# Patient Record
Sex: FEMALE | Race: WHITE | Employment: UNEMPLOYED | URBAN - METROPOLITAN AREA
[De-identification: names, ages, dates, MRNs, and addresses within clinical notes are randomized per-mention and may not be internally consistent; named-entity substitution may affect disease eponyms.]

---

## 2017-09-16 PROBLEM — M72.6 NECROTIZING FASCIITIS (HCC): Status: ACTIVE | Noted: 2017-09-16

## 2018-12-24 ENCOUNTER — APPOINTMENT (OUTPATIENT)
Dept: GENERAL RADIOLOGY | Age: 33
End: 2018-12-24

## 2018-12-24 ENCOUNTER — HOSPITAL ENCOUNTER (EMERGENCY)
Age: 33
Discharge: HOME | End: 2018-12-24

## 2018-12-24 VITALS
SYSTOLIC BLOOD PRESSURE: 140 MMHG | WEIGHT: 172 LBS | DIASTOLIC BLOOD PRESSURE: 77 MMHG | TEMPERATURE: 97.5 F | HEART RATE: 99 BPM | RESPIRATION RATE: 17 BRPM | HEIGHT: 70 IN | BODY MASS INDEX: 24.62 KG/M2 | OXYGEN SATURATION: 100 %

## 2018-12-24 DIAGNOSIS — M25.441 FINGER JOINT SWELLING, RIGHT: Primary | ICD-10-CM

## 2018-12-24 PROCEDURE — 99283 EMERGENCY DEPT VISIT LOW MDM: CPT

## 2018-12-24 PROCEDURE — 73140 X-RAY EXAM OF FINGER(S): CPT

## 2018-12-24 ASSESSMENT — PAIN DESCRIPTION - PAIN TYPE: TYPE: ACUTE PAIN

## 2018-12-24 ASSESSMENT — PAIN DESCRIPTION - LOCATION: LOCATION: FINGER (COMMENT WHICH ONE)

## 2018-12-24 ASSESSMENT — PAIN SCALES - GENERAL: PAINLEVEL_OUTOF10: 5

## 2018-12-24 ASSESSMENT — PAIN DESCRIPTION - ORIENTATION: ORIENTATION: RIGHT

## 2018-12-24 ASSESSMENT — PAIN DESCRIPTION - DESCRIPTORS: DESCRIPTORS: THROBBING

## 2018-12-25 NOTE — ED NOTES
Name: Leti Stinson  : 1985  MRN: [id number]    Date: 2018    Benefits of immediately proceeding with Radiology exam outweigh the risks and therefore the following is being waived:      [x] Pregnancy test    [] Protocol for Iodine allergy    [] MRI questionnaire    [] BUN/Creatinine        MARIBELL Hunt, [place]  18 9021

## 2024-02-20 ENCOUNTER — APPOINTMENT (OUTPATIENT)
Dept: PRIMARY CARE | Facility: CLINIC | Age: 39
End: 2024-02-20
Payer: COMMERCIAL

## 2024-03-05 ENCOUNTER — OFFICE VISIT (OUTPATIENT)
Dept: PRIMARY CARE | Facility: CLINIC | Age: 39
End: 2024-03-05
Payer: COMMERCIAL

## 2024-03-05 ENCOUNTER — LAB (OUTPATIENT)
Dept: LAB | Facility: LAB | Age: 39
End: 2024-03-05
Payer: COMMERCIAL

## 2024-03-05 VITALS
BODY MASS INDEX: 30.78 KG/M2 | WEIGHT: 215 LBS | HEIGHT: 70 IN | SYSTOLIC BLOOD PRESSURE: 119 MMHG | OXYGEN SATURATION: 98 % | DIASTOLIC BLOOD PRESSURE: 72 MMHG | HEART RATE: 106 BPM

## 2024-03-05 DIAGNOSIS — Z00.00 ROUTINE GENERAL MEDICAL EXAMINATION AT A HEALTH CARE FACILITY: ICD-10-CM

## 2024-03-05 DIAGNOSIS — Z00.00 ROUTINE GENERAL MEDICAL EXAMINATION AT A HEALTH CARE FACILITY: Primary | ICD-10-CM

## 2024-03-05 DIAGNOSIS — Z11.59 NEED FOR HEPATITIS C SCREENING TEST: ICD-10-CM

## 2024-03-05 LAB
ALBUMIN SERPL BCP-MCNC: 4.5 G/DL (ref 3.4–5)
ALP SERPL-CCNC: 59 U/L (ref 33–110)
ALT SERPL W P-5'-P-CCNC: 516 U/L (ref 7–45)
ANION GAP SERPL CALC-SCNC: 12 MMOL/L (ref 10–20)
APPEARANCE UR: CLEAR
AST SERPL W P-5'-P-CCNC: 282 U/L (ref 9–39)
BILIRUB SERPL-MCNC: 1.1 MG/DL (ref 0–1.2)
BILIRUB UR STRIP.AUTO-MCNC: NEGATIVE MG/DL
BUN SERPL-MCNC: 14 MG/DL (ref 6–23)
CALCIUM SERPL-MCNC: 10.1 MG/DL (ref 8.6–10.6)
CHLORIDE SERPL-SCNC: 103 MMOL/L (ref 98–107)
CHOLEST SERPL-MCNC: 159 MG/DL (ref 0–199)
CHOLESTEROL/HDL RATIO: 3
CO2 SERPL-SCNC: 29 MMOL/L (ref 21–32)
COLOR UR: YELLOW
CREAT SERPL-MCNC: 0.9 MG/DL (ref 0.5–1.05)
EGFRCR SERPLBLD CKD-EPI 2021: 85 ML/MIN/1.73M*2
ERYTHROCYTE [DISTWIDTH] IN BLOOD BY AUTOMATED COUNT: 12.6 % (ref 11.5–14.5)
GLUCOSE SERPL-MCNC: 80 MG/DL (ref 74–99)
GLUCOSE UR STRIP.AUTO-MCNC: NORMAL MG/DL
HCT VFR BLD AUTO: 46.3 % (ref 36–46)
HCV AB SER QL: REACTIVE
HDLC SERPL-MCNC: 53.3 MG/DL
HGB BLD-MCNC: 15.6 G/DL (ref 12–16)
KETONES UR STRIP.AUTO-MCNC: NEGATIVE MG/DL
LDLC SERPL CALC-MCNC: 88 MG/DL
LEUKOCYTE ESTERASE UR QL STRIP.AUTO: NEGATIVE
MCH RBC QN AUTO: 31.1 PG (ref 26–34)
MCHC RBC AUTO-ENTMCNC: 33.7 G/DL (ref 32–36)
MCV RBC AUTO: 92 FL (ref 80–100)
NITRITE UR QL STRIP.AUTO: NEGATIVE
NON HDL CHOLESTEROL: 106 MG/DL (ref 0–149)
NRBC BLD-RTO: 0 /100 WBCS (ref 0–0)
PH UR STRIP.AUTO: 7 [PH]
PLATELET # BLD AUTO: 354 X10*3/UL (ref 150–450)
POTASSIUM SERPL-SCNC: 4.3 MMOL/L (ref 3.5–5.3)
PROT SERPL-MCNC: 7.9 G/DL (ref 6.4–8.2)
PROT UR STRIP.AUTO-MCNC: NEGATIVE MG/DL
RBC # BLD AUTO: 5.02 X10*6/UL (ref 4–5.2)
RBC # UR STRIP.AUTO: NEGATIVE /UL
SODIUM SERPL-SCNC: 140 MMOL/L (ref 136–145)
SP GR UR STRIP.AUTO: 1.02
TRIGL SERPL-MCNC: 91 MG/DL (ref 0–149)
TSH SERPL-ACNC: 2 MIU/L (ref 0.44–3.98)
UROBILINOGEN UR STRIP.AUTO-MCNC: ABNORMAL MG/DL
VLDL: 18 MG/DL (ref 0–40)
WBC # BLD AUTO: 7.9 X10*3/UL (ref 4.4–11.3)

## 2024-03-05 PROCEDURE — 85027 COMPLETE CBC AUTOMATED: CPT

## 2024-03-05 PROCEDURE — 81003 URINALYSIS AUTO W/O SCOPE: CPT

## 2024-03-05 PROCEDURE — 80053 COMPREHEN METABOLIC PANEL: CPT

## 2024-03-05 PROCEDURE — 87522 HEPATITIS C REVRS TRNSCRPJ: CPT

## 2024-03-05 PROCEDURE — 36415 COLL VENOUS BLD VENIPUNCTURE: CPT

## 2024-03-05 PROCEDURE — 99385 PREV VISIT NEW AGE 18-39: CPT | Performed by: FAMILY MEDICINE

## 2024-03-05 PROCEDURE — 99203 OFFICE O/P NEW LOW 30 MIN: CPT | Performed by: FAMILY MEDICINE

## 2024-03-05 PROCEDURE — 1036F TOBACCO NON-USER: CPT | Performed by: FAMILY MEDICINE

## 2024-03-05 PROCEDURE — 80061 LIPID PANEL: CPT

## 2024-03-05 PROCEDURE — 86803 HEPATITIS C AB TEST: CPT

## 2024-03-05 PROCEDURE — 84443 ASSAY THYROID STIM HORMONE: CPT

## 2024-03-05 ASSESSMENT — PATIENT HEALTH QUESTIONNAIRE - PHQ9
4. FEELING TIRED OR HAVING LITTLE ENERGY: NOT AT ALL
7. TROUBLE CONCENTRATING ON THINGS, SUCH AS READING THE NEWSPAPER OR WATCHING TELEVISION: SEVERAL DAYS
2. FEELING DOWN, DEPRESSED OR HOPELESS: NOT AT ALL
10. IF YOU CHECKED OFF ANY PROBLEMS, HOW DIFFICULT HAVE THESE PROBLEMS MADE IT FOR YOU TO DO YOUR WORK, TAKE CARE OF THINGS AT HOME, OR GET ALONG WITH OTHER PEOPLE: NOT DIFFICULT AT ALL
6. FEELING BAD ABOUT YOURSELF - OR THAT YOU ARE A FAILURE OR HAVE LET YOURSELF OR YOUR FAMILY DOWN: NOT AT ALL
9. THOUGHTS THAT YOU WOULD BE BETTER OFF DEAD, OR OF HURTING YOURSELF: NOT AT ALL
3. TROUBLE FALLING OR STAYING ASLEEP OR SLEEPING TOO MUCH: NOT AT ALL
1. LITTLE INTEREST OR PLEASURE IN DOING THINGS: NOT AT ALL
8. MOVING OR SPEAKING SO SLOWLY THAT OTHER PEOPLE COULD HAVE NOTICED. OR THE OPPOSITE, BEING SO FIGETY OR RESTLESS THAT YOU HAVE BEEN MOVING AROUND A LOT MORE THAN USUAL: NOT AT ALL
SUM OF ALL RESPONSES TO PHQ9 QUESTIONS 1 AND 2: 0
SUM OF ALL RESPONSES TO PHQ QUESTIONS 1-9: 1
5. POOR APPETITE OR OVEREATING: NOT AT ALL

## 2024-03-05 NOTE — PROGRESS NOTES
"Subjective   Patient ID: Lucy Montalvo [id number] is a 38 y.o. female who presents for Establish Care (New patient wants to discuss medication for Hep C).    HPI       Social History     Tobacco Use    Smoking status: Never    Smokeless tobacco: Never   Vaping Use    Vaping Use: Every day    Substances: Nicotine, Flavoring    Devices: Disposable   Substance Use Topics    Alcohol use: Yes     Comment: weekly    Drug use: Not Currently     Types: Heroin       No Known Allergies    No current outpatient medications on file.     No current facility-administered medications for this visit.       Physical Exam  /72   Pulse 106   Ht 1.778 m (5' 10\")   Wt 97.5 kg (215 lb)   SpO2 98%   BMI 30.85 kg/m²     General Appearance:  Alert, cooperative, no distress,   Head:  Normocephalic, atraumatic   Eyes:  PERRL, conjunctiva/corneas clear, EOM's intact,    Lungs:   Clear to auscultation bilaterally, respirations unlabored   Heart:  Regular rate and rhythm, S1 and S2 normal, no murmur,    Neurologic: Normal      No visits with results within 3 Month(s) from this visit.   Latest known visit with results is:   No results found for any previous visit.       Assessment/Plan   {Assess/PlanSmartLinks:10071}    "

## 2024-03-05 NOTE — PROGRESS NOTES
"Patient ID: Lucy Montalvo [id number] is a 38 y.o. female who presents for Establish Care (New patient wants to discuss medication for Hep C).    Pt is here to establish care and get complete physical  Patient was a heroin addict sober since 2018.  Patient has hep C.  Pt currently asymptomatic denies nausea vomiting abdominal pain or diarrhea.    Patient looking for treatment for hep C  Currently not following Diet or exercise  Medication - none   Supplements - none  Denies smoking/occasional alcohol,  Immunization   Flu - declined   , TDAp 2019   Pap Smear -2019 ,   LMP  - 2 days ago , regular periods    Immunization History   Administered Date(s) Administered    Pfizer Purple Cap SARS-CoV-2 05/29/2021, 06/19/2021       No Known Allergies    No current outpatient medications on file.     No current facility-administered medications for this visit.       Past Medical History:   Diagnosis Date    Infectious viral hepatitis 2018     Social History     Tobacco Use    Smoking status: Never    Smokeless tobacco: Never   Vaping Use    Vaping Use: Every day    Substances: Nicotine, Flavoring    Devices: Disposable   Substance Use Topics    Alcohol use: Yes     Comment: weekly    Drug use: Not Currently     Types: Heroin     Family History   Problem Relation Name Age of Onset    Alzheimer's disease Mother      Alzheimer's disease Maternal Grandmother      Breast cancer Maternal Grandfather      Prostate cancer Maternal Grandfather         /72   Pulse 106   Ht 1.778 m (5' 10\")   Wt 97.5 kg (215 lb)   SpO2 98%   BMI 30.85 kg/m²   Review of System  Constitutional:  Negative for appetite change, chills, fatigue, fever and unexpected weight change.   HENT:  Negative for hearing loss and voice change.    Eyes:  Negative for visual disturbance.   Respiratory:  Negative for cough, chest tightness, shortness of breath and wheezing.    Cardiovascular:  Negative for chest pain and leg swelling.   Gastrointestinal:  Negative for " "abdominal pain, blood in stool, constipation and diarrhea.   Genitourinary:  Negative for difficulty urinating and dysuria.   Musculoskeletal:  Negative for arthralgias and joint swelling.   Skin:  Negative for rash.   Neurological:  Negative for dizziness, weakness, numbness and headaches.   Psychiatric/Behavioral:  Negative for behavioral problems and confusion.    /72   Pulse 106   Ht 1.778 m (5' 10\")   Wt 97.5 kg (215 lb)   SpO2 98%   BMI 30.85 kg/m²     General Appearance:    Alert, cooperative, no distress, appears stated age   Head:    Normocephalic, without obvious abnormality, atraumatic   Eyes:    PERRL, conjunctiva/corneas clear, EOM's intact,    Neck:   Supple, symmetrical, No enlargement   Back:     Symmetric, no curvature, ROM normal, no CVA tenderness   Lungs:     Clear to auscultation bilaterally, respirations normal ,no wheezing or ronchi   Chest Wall:    No tenderness or deformity   Abdomen:     Soft, non-tender, bowel sounds active all four quadrants, no masses,   no organomegaly   Extremities:   Extremities normal, atraumatic, no cyanosis or edema   Pulses:   2+ and symmetric all extremities   Skin:   Skin color, texture, turgor normal, no rashes or lesions   Lymph nodes:   Cervical nodes normal   Neurologic:    normal strength, sensation and reflexes.      No visits with results within 3 Month(s) from this visit.   Latest known visit with results is:   No results found for any previous visit.       Assessment/Plan   Diagnoses and all orders for this visit:  Routine general medical examination at a health care facility  -     TSH with reflex to Free T4 if abnormal; Future  -     Lipid Panel; Future  -     Comprehensive Metabolic Panel; Future  -     CBC; Future  -     Urinalysis with Reflex Microscopic; Future  Need for hepatitis C screening test  -     Hepatitis C antibody; Future  -     Hepatitis C RNA, Quantitative, PCR; Future     Patient was advised to get complete blood " work  Follow healthy diet and daily walking for 30 minutes   refer to GI for treatment for hep C.      F/up 2-3 week for test results and pap smear

## 2024-03-06 LAB
HCV RNA SERPL NAA+PROBE-ACNC: ABNORMAL IU/ML
HCV RNA SERPL NAA+PROBE-ACNC: DETECTED K[IU]/ML
HCV RNA SERPL NAA+PROBE-LOG IU: 7.14 LOG IU/ML

## 2024-03-07 ENCOUNTER — TELEPHONE (OUTPATIENT)
Dept: PRIMARY CARE | Facility: CLINIC | Age: 39
End: 2024-03-07
Payer: COMMERCIAL

## 2024-03-07 DIAGNOSIS — R10.9 ABDOMINAL PAIN, UNSPECIFIED ABDOMINAL LOCATION: ICD-10-CM

## 2024-03-07 NOTE — TELEPHONE ENCOUNTER
----- Message from Lashon Odonnell MD sent at 3/6/2024  3:00 PM EST -----  Pt need Biliary ECHO for abnormal transaminase.   Need follow up With GI for treatment of Cirrhosis

## 2024-03-07 NOTE — TELEPHONE ENCOUNTER
----- Message from Lashon Odonnell MD sent at 3/7/2024 12:57 PM EST -----  Elevated liver enzymes ++   Most likely due to Hep C.  Need US for more info  ----- Message -----  From: Lucia Michaels MA  Sent: 3/7/2024   8:33 AM EST  To: Lashon Odonnell MD    Pt aware of biliary echo but wants to know more about the results such as what does this high number mean. Please advise  ----- Message -----  From: Lashon Odonnell MD  Sent: 3/6/2024   3:00 PM EST  To: Lucia Michaels MA    Pt need Biliary ECHO for abnormal transaminase.   Need follow up With GI for treatment of Cirrhosis

## 2024-03-12 ENCOUNTER — TELEPHONE (OUTPATIENT)
Dept: PRIMARY CARE | Facility: CLINIC | Age: 39
End: 2024-03-12
Payer: COMMERCIAL

## 2024-03-12 NOTE — TELEPHONE ENCOUNTER
NATASHAT WAS SEEN LAST WEEK BY DR PÉREZ AND GIVEN A CARD WITH THE NAME OF NAVJOT LIZARRAGA DR BUT LOST THE CARD.  PLEASE CALL PATIENT BACK WITH DR NAME AND NUMBER  THANK YOU

## 2024-03-22 ENCOUNTER — TELEPHONE (OUTPATIENT)
Dept: PRIMARY CARE | Facility: CLINIC | Age: 39
End: 2024-03-22
Payer: COMMERCIAL

## 2024-03-25 ENCOUNTER — TELEPHONE (OUTPATIENT)
Dept: PRIMARY CARE | Facility: CLINIC | Age: 39
End: 2024-03-25
Payer: COMMERCIAL

## 2024-03-25 NOTE — TELEPHONE ENCOUNTER
Pt aware of the ultrasound results did ask if there could be something else that could have affected the liver lab levels does have an appt on 3/28 will discuss at that appointment

## 2024-03-28 ENCOUNTER — OFFICE VISIT (OUTPATIENT)
Dept: PRIMARY CARE | Facility: CLINIC | Age: 39
End: 2024-03-28
Payer: COMMERCIAL

## 2024-03-28 VITALS
DIASTOLIC BLOOD PRESSURE: 84 MMHG | OXYGEN SATURATION: 98 % | HEART RATE: 106 BPM | BODY MASS INDEX: 30.81 KG/M2 | HEIGHT: 70 IN | WEIGHT: 215.2 LBS | SYSTOLIC BLOOD PRESSURE: 129 MMHG

## 2024-03-28 DIAGNOSIS — Z87.898 H/O INTRAVENOUS DRUG USE IN REMISSION: ICD-10-CM

## 2024-03-28 DIAGNOSIS — B18.2: ICD-10-CM

## 2024-03-28 DIAGNOSIS — Z12.4 PAP SMEAR FOR CERVICAL CANCER SCREENING: Primary | ICD-10-CM

## 2024-03-28 DIAGNOSIS — R74.01 ELEVATED TRANSAMINASE LEVEL: ICD-10-CM

## 2024-03-28 PROCEDURE — 99213 OFFICE O/P EST LOW 20 MIN: CPT | Performed by: FAMILY MEDICINE

## 2024-03-28 PROCEDURE — 1036F TOBACCO NON-USER: CPT | Performed by: FAMILY MEDICINE

## 2024-03-28 PROCEDURE — 87624 HPV HI-RISK TYP POOLED RSLT: CPT

## 2024-03-28 PROCEDURE — 88175 CYTOPATH C/V AUTO FLUID REDO: CPT

## 2024-03-28 NOTE — PROGRESS NOTES
"Patient ID: Lucy Montalvo [id number] is a 38 y.o. female who presents for Follow-up (Pap and discuss labs).    Pt  with active hepatitis C and elevated transaminase.  Liver ultrasound normal.  Patient is going to see GI in May.  Currently asymptomatic.  Patient made aware of all results  Here for Pap smear    Patient was a heroin addict sober since 2018.  Patient has hep C.  Pt currently asymptomatic denies nausea vomiting abdominal pain or diarrhea.    Patient looking for treatment for hep C  Flu - declined   , TDAp 2019   Pap Smear -2019 ,   LMP  - 2 days ago , regular periods    Immunization History   Administered Date(s) Administered    Pfizer Purple Cap SARS-CoV-2 05/29/2021, 06/19/2021       No Known Allergies    No current outpatient medications on file.     No current facility-administered medications for this visit.       Past Medical History:   Diagnosis Date    Infectious viral hepatitis 2018     Social History     Tobacco Use    Smoking status: Never    Smokeless tobacco: Never   Vaping Use    Vaping Use: Every day    Substances: Nicotine, Flavoring    Devices: Disposable   Substance Use Topics    Alcohol use: Yes     Comment: weekly    Drug use: Not Currently     Types: Heroin     Family History   Problem Relation Name Age of Onset    Alzheimer's disease Mother      Alzheimer's disease Maternal Grandmother      Breast cancer Maternal Grandfather      Prostate cancer Maternal Grandfather             /84   Pulse 106   Ht 1.778 m (5' 10\")   Wt 97.6 kg (215 lb 3.2 oz)   SpO2 98%   BMI 30.88 kg/m²     General Appearance:  Alert, cooperative, no distress,   Head:  Normocephalic, atraumatic   Eyes:  PERRL, conjunctiva/corneas clear, EOM's intact,    Lungs:   Clear to auscultation bilaterally, respirations unlabored   Heart:  Regular rate and rhythm, S1 and S2 normal, no murmur,    Extremities: Extremities normal, No edema   Neurologic: Normal     Pelvic:  External Genitalia Normal, Cervix normal, No " CMT or adnexal tenderness. No vaginal discharge           Lab on 03/05/2024   Component Date Value Ref Range Status    Thyroid Stimulating Hormone 03/05/2024 2.00  0.44 - 3.98 mIU/L Final    Cholesterol 03/05/2024 159  0 - 199 mg/dL Final          Age      Desirable   Borderline High   High     0-19 Y     0 - 169       170 - 199     >/= 200    20-24 Y     0 - 189       190 - 224     >/= 225         >24 Y     0 - 199       200 - 239     >/= 240   **All ranges are based on fasting samples. Specific   therapeutic targets will vary based on patient-specific   cardiac risk.    Pediatric guidelines reference:Pediatrics 2011, 128(S5).Adult guidelines reference: NCEP ATPIII Guidelines,EDWIN 2001, 258:2486-97    Venipuncture immediately after or during the administration of Metamizole may lead to falsely low results. Testing should be performed immediately prior to Metamizole dosing.    HDL-Cholesterol 03/05/2024 53.3  mg/dL Final      Age       Very Low   Low     Normal    High    0-19 Y    < 35      < 40     40-45     ----  20-24 Y    ----     < 40      >45      ----        >24 Y      ----     < 40     40-60      >60      Cholesterol/HDL Ratio 03/05/2024 3.0   Final      Ref Values  Desirable  < 3.4  High Risk  > 5.0    LDL Calculated 03/05/2024 88  <=99 mg/dL Final                                Near   Borderline      AGE      Desirable  Optimal    High     High     Very High     0-19 Y     0 - 109     ---    110-129   >/= 130     ----    20-24 Y     0 - 119     ---    120-159   >/= 160     ----      >24 Y     0 -  99   100-129  130-159   160-189     >/=190      VLDL 03/05/2024 18  0 - 40 mg/dL Final    Triglycerides 03/05/2024 91  0 - 149 mg/dL Final       Age         Desirable   Borderline High   High     Very High   0 D-90 D    19 - 174         ----         ----        ----  91 D- 9 Y     0 -  74        75 -  99     >/= 100      ----    10-19 Y     0 -  89        90 - 129     >/= 130      ----    20-24 Y     0 - 114        115 - 149     >/= 150      ----         >24 Y     0 - 149       150 - 199    200- 499    >/= 500    Venipuncture immediately after or during the administration of Metamizole may lead to falsely low results. Testing should be performed immediately prior to Metamizole dosing.    Non HDL Cholesterol 03/05/2024 106  0 - 149 mg/dL Final          Age       Desirable   Borderline High   High     Very High     0-19 Y     0 - 119       120 - 144     >/= 145    >/= 160    20-24 Y     0 - 149       150 - 189     >/= 190      ----         >24 Y    30 mg/dL above LDL Cholesterol goal      Glucose 03/05/2024 80  74 - 99 mg/dL Final    Sodium 03/05/2024 140  136 - 145 mmol/L Final    Potassium 03/05/2024 4.3  3.5 - 5.3 mmol/L Final    Chloride 03/05/2024 103  98 - 107 mmol/L Final    Bicarbonate 03/05/2024 29  21 - 32 mmol/L Final    Anion Gap 03/05/2024 12  10 - 20 mmol/L Final    Urea Nitrogen 03/05/2024 14  6 - 23 mg/dL Final    Creatinine 03/05/2024 0.90  0.50 - 1.05 mg/dL Final    eGFR 03/05/2024 85  >60 mL/min/1.73m*2 Final    Calculations of estimated GFR are performed using the 2021 CKD-EPI Study Refit equation without the race variable for the IDMS-Traceable creatinine methods.  https://jasn.asnjournals.org/content/early/2021/09/22/ASN.7193962597    Calcium 03/05/2024 10.1  8.6 - 10.6 mg/dL Final    Albumin 03/05/2024 4.5  3.4 - 5.0 g/dL Final    Alkaline Phosphatase 03/05/2024 59  33 - 110 U/L Final    Total Protein 03/05/2024 7.9  6.4 - 8.2 g/dL Final    AST 03/05/2024 282 (H)  9 - 39 U/L Final    Bilirubin, Total 03/05/2024 1.1  0.0 - 1.2 mg/dL Final    ALT 03/05/2024 516 (H)  7 - 45 U/L Final    Patients treated with Sulfasalazine may generate falsely decreased results for ALT.    WBC 03/05/2024 7.9  4.4 - 11.3 x10*3/uL Final    nRBC 03/05/2024 0.0  0.0 - 0.0 /100 WBCs Final    RBC 03/05/2024 5.02  4.00 - 5.20 x10*6/uL Final    Hemoglobin 03/05/2024 15.6  12.0 - 16.0 g/dL Final    Hematocrit 03/05/2024 46.3 (H)   36.0 - 46.0 % Final    MCV 03/05/2024 92  80 - 100 fL Final    MCH 03/05/2024 31.1  26.0 - 34.0 pg Final    MCHC 03/05/2024 33.7  32.0 - 36.0 g/dL Final    RDW 03/05/2024 12.6  11.5 - 14.5 % Final    Platelets 03/05/2024 354  150 - 450 x10*3/uL Final    Color, Urine 03/05/2024 Yellow  Light-Yellow, Yellow, Dark-Yellow Final    Appearance, Urine 03/05/2024 Clear  Clear Final    Specific Gravity, Urine 03/05/2024 1.017  1.005 - 1.035 Final    pH, Urine 03/05/2024 7.0  5.0, 5.5, 6.0, 6.5, 7.0, 7.5, 8.0 Final    Protein, Urine 03/05/2024 NEGATIVE  NEGATIVE, 10 (TRACE), 20 (TRACE) mg/dL Final    Glucose, Urine 03/05/2024 Normal  Normal mg/dL Final    Blood, Urine 03/05/2024 NEGATIVE  NEGATIVE Final    Ketones, Urine 03/05/2024 NEGATIVE  NEGATIVE mg/dL Final    Bilirubin, Urine 03/05/2024 NEGATIVE  NEGATIVE Final    Urobilinogen, Urine 03/05/2024 2 (1+) (A)  Normal mg/dL Final    Due to a manufacturing issue, low positive urobilinogen results may be falsely positive. Correlate with urine bilirubin and additional clinical/laboratory findings to assess the risk of hemolytic anemia or liver disease. If clinically indicated, repeat testing with an alternate method is available by contacting the laboratory within 24 hours.    Some pigments and medications may cause a false positive urobilinogen.    Nitrite, Urine 03/05/2024 NEGATIVE  NEGATIVE Final    Leukocyte Esterase, Urine 03/05/2024 NEGATIVE  NEGATIVE Final    Hepatitis C AB 03/05/2024 Reactive (A)  Nonreactive Final    HCV antibody detected. HCV RNA PCR has been ordered to evaluate the possibility of a current infection.     Results from patients taking biotin supplements or receiving high-dose biotin therapy should be interpreted with caution due to possible interference with this test. Providers may contact their local laboratory for further information.    HCV PCR Quant 03/05/2024 13,800,000 (H)  Not detected IU/mL Final    Hepatitis C RNA PCR Log 03/05/2024 7.14  Log  IU/mL Final    Not calculated    HCV RNA Result 03/05/2024 Detected (A)  Not detected Final       Assessment/Plan   Diagnoses and all orders for this visit:  Lucy was seen today for follow-up.  Diagnoses and all orders for this visit:  Pap smear for cervical cancer screening (Primary)  -     THINPREP PAP TEST  Chronic active hepatitis C (CMS/HCC)  H/O intravenous drug use in remission  Elevated transaminase level    Patient was informed of all test result and ultrasound.  F/up  GI for treatment for active hep C.    Pap smear done today will call with abnormal results

## 2024-04-08 LAB
CYTOLOGY CMNT CVX/VAG CYTO-IMP: NORMAL
HPV HR 12 DNA GENITAL QL NAA+PROBE: NEGATIVE
HPV HR GENOTYPES PNL CVX NAA+PROBE: NEGATIVE
HPV16 DNA SPEC QL NAA+PROBE: NEGATIVE
HPV18 DNA SPEC QL NAA+PROBE: NEGATIVE
LAB AP HPV GENOTYPE QUESTION: YES
LAB AP HPV HR: NORMAL
LABORATORY COMMENT REPORT: NORMAL
PATH REPORT.TOTAL CANCER: NORMAL

## 2024-06-21 ENCOUNTER — APPOINTMENT (OUTPATIENT)
Dept: PRIMARY CARE | Facility: CLINIC | Age: 39
End: 2024-06-21
Payer: COMMERCIAL

## 2024-07-17 ENCOUNTER — HOSPITAL ENCOUNTER (EMERGENCY)
Facility: HOSPITAL | Age: 39
Discharge: HOME | End: 2024-07-17
Payer: COMMERCIAL

## 2024-07-17 VITALS
HEART RATE: 111 BPM | HEIGHT: 70 IN | WEIGHT: 200 LBS | BODY MASS INDEX: 28.63 KG/M2 | SYSTOLIC BLOOD PRESSURE: 154 MMHG | OXYGEN SATURATION: 96 % | TEMPERATURE: 98.7 F | DIASTOLIC BLOOD PRESSURE: 74 MMHG | RESPIRATION RATE: 16 BRPM

## 2024-07-17 DIAGNOSIS — S61.219A FINGER LACERATION, INITIAL ENCOUNTER: Primary | ICD-10-CM

## 2024-07-17 PROCEDURE — 99283 EMERGENCY DEPT VISIT LOW MDM: CPT

## 2024-07-17 PROCEDURE — 2500000001 HC RX 250 WO HCPCS SELF ADMINISTERED DRUGS (ALT 637 FOR MEDICARE OP): Performed by: NURSE PRACTITIONER

## 2024-07-17 PROCEDURE — 2500000005 HC RX 250 GENERAL PHARMACY W/O HCPCS: Performed by: NURSE PRACTITIONER

## 2024-07-17 RX ORDER — TRAMADOL HYDROCHLORIDE 50 MG/1
50 TABLET ORAL ONCE
Status: COMPLETED | OUTPATIENT
Start: 2024-07-17 | End: 2024-07-17

## 2024-07-17 RX ORDER — BACITRACIN ZINC 500 UNIT/G
OINTMENT IN PACKET (EA) TOPICAL ONCE
Status: COMPLETED | OUTPATIENT
Start: 2024-07-17 | End: 2024-07-17

## 2024-07-17 ASSESSMENT — LIFESTYLE VARIABLES
EVER FELT BAD OR GUILTY ABOUT YOUR DRINKING: NO
HAVE YOU EVER FELT YOU SHOULD CUT DOWN ON YOUR DRINKING: NO
EVER HAD A DRINK FIRST THING IN THE MORNING TO STEADY YOUR NERVES TO GET RID OF A HANGOVER: NO
HAVE PEOPLE ANNOYED YOU BY CRITICIZING YOUR DRINKING: NO
TOTAL SCORE: 0

## 2024-07-17 ASSESSMENT — PAIN SCALES - GENERAL: PAINLEVEL_OUTOF10: 10 - WORST POSSIBLE PAIN

## 2024-07-17 ASSESSMENT — COLUMBIA-SUICIDE SEVERITY RATING SCALE - C-SSRS
6. HAVE YOU EVER DONE ANYTHING, STARTED TO DO ANYTHING, OR PREPARED TO DO ANYTHING TO END YOUR LIFE?: NO
1. IN THE PAST MONTH, HAVE YOU WISHED YOU WERE DEAD OR WISHED YOU COULD GO TO SLEEP AND NOT WAKE UP?: NO
2. HAVE YOU ACTUALLY HAD ANY THOUGHTS OF KILLING YOURSELF?: NO

## 2024-07-17 ASSESSMENT — PAIN - FUNCTIONAL ASSESSMENT: PAIN_FUNCTIONAL_ASSESSMENT: 0-10

## 2024-07-17 NOTE — ED PROVIDER NOTES
HPI   Chief Complaint   Patient presents with    Laceration     Cut tip of thumb with vegetable slicer       This is a 39-year-old  female presenting to the emergency room with complaints of a right thumb laceration while using a mandolin slicer.  The patient was trying to slice vegetables and accidentally cut her thumb on the blade.  The patient reports that she is experiencing 10 out of 10 pain to the right thumb.  She denies any loss of motor function or sensation to the extremity.  The patient is right-hand dominant.  Her immunizations are up-to-date.      History provided by:  Patient   used: No            Patient History   Past Medical History:   Diagnosis Date    Infectious viral hepatitis 2018     History reviewed. No pertinent surgical history.  Family History   Problem Relation Name Age of Onset    Alzheimer's disease Mother      Alzheimer's disease Maternal Grandmother      Breast cancer Maternal Grandfather      Prostate cancer Maternal Grandfather       Social History     Tobacco Use    Smoking status: Never    Smokeless tobacco: Never   Vaping Use    Vaping status: Every Day    Substances: Nicotine, Flavoring    Devices: Disposable   Substance Use Topics    Alcohol use: Yes     Comment: weekly    Drug use: Not Currently     Types: Heroin       Physical Exam   ED Triage Vitals [07/17/24 1833]   Temperature Heart Rate Respirations BP   37.1 °C (98.7 °F) (!) 111 16 154/74      Pulse Ox Temp src Heart Rate Source Patient Position   96 % -- -- --      BP Location FiO2 (%)     -- --       Physical Exam  Vitals and nursing note reviewed.   HENT:      Head: Normocephalic.   Eyes:      Conjunctiva/sclera: Conjunctivae normal.      Pupils: Pupils are equal, round, and reactive to light.   Cardiovascular:      Rate and Rhythm: Normal rate and regular rhythm.      Pulses: Normal pulses.      Heart sounds: Normal heart sounds.   Pulmonary:      Effort: Pulmonary effort is normal.       Breath sounds: Normal breath sounds.   Musculoskeletal:         General: Normal range of motion.      Comments: Patient has full no laxity of the extremity with full range of motion against resistance.  Distal extremity with brisk cap refill and sensation intact.   Skin:     General: Skin is warm.      Capillary Refill: Capillary refill takes less than 2 seconds.      Comments: The patient has a 1.5 cm avulsion laceration noted to the lateral aspect of the right first distal phalanx.  No nailbed involvement.  There is persistent bleeding.  The wound is very superficial in nature.  No bony involvement.   Neurological:      General: No focal deficit present.      Mental Status: She is alert.   Psychiatric:         Mood and Affect: Mood normal.           ED Course & MDM   Diagnoses as of 07/17/24 1937   Finger laceration, initial encounter                       José Miguel Coma Scale Score: 15                        Medical Decision Making  Patient was seen and evaluated by the nurse practitioner, Maureen Luna.  The right thumb was evaluated and she has an avulsion laceration noted to the distal phalanx.  Suture repair is not possible.  The wound was cleaned with Shur-Clens and irrigated with sterile saline.  Gelfoam, bacitracin, and a nonadherent dressing was applied.  The patient tolerated the procedure well.  The patient was educated on wound care and signs and symptoms of infection.  The patient is to follow up with their primary care physician in the next 2-3 days.  The patient is to return to the ED worse in any way.  The patient was discharged in stable condition with computer discharge instructions given. Patient was agreeable with discharge planning.        Procedure  Procedures     FLOR Edwards-CNP  07/17/24 1940

## 2024-12-10 LAB
NON-UH HIE A/G RATIO: 1.1
NON-UH HIE ALB: 4 G/DL (ref 3.4–5)
NON-UH HIE ALK PHOS: 74 UNIT/L (ref 45–117)
NON-UH HIE BASO COUNT: 0.07 X1000 (ref 0–0.2)
NON-UH HIE BASOS %: 1.3 %
NON-UH HIE BILIRUBIN, TOTAL: 1.1 MG/DL (ref 0.3–1.2)
NON-UH HIE BUN/CREAT RATIO: 12.5
NON-UH HIE BUN: 10 MG/DL (ref 9–23)
NON-UH HIE CALCIUM: 9.6 MG/DL (ref 8.7–10.4)
NON-UH HIE CALCULATED OSMOLALITY: 281 MOSM/KG (ref 275–295)
NON-UH HIE CHLORIDE: 108 MMOL/L (ref 98–107)
NON-UH HIE CO2, VENOUS: 26 MMOL/L (ref 20–31)
NON-UH HIE CREATININE: 0.8 MG/DL (ref 0.5–0.8)
NON-UH HIE DIFF?: NO
NON-UH HIE EOS COUNT: 0.14 X1000 (ref 0–0.5)
NON-UH HIE EOSIN %: 2.4 %
NON-UH HIE GFR AA: >60
NON-UH HIE GLOBULIN: 3.6 G/DL
NON-UH HIE GLOMERULAR FILTRATION RATE: >60 ML/MIN/1.73M?
NON-UH HIE GLUCOSE: 84 MG/DL (ref 74–106)
NON-UH HIE GOT: 24 UNIT/L (ref 15–37)
NON-UH HIE GPT: 16 UNIT/L (ref 10–49)
NON-UH HIE HCT: 43 % (ref 36–46)
NON-UH HIE HGB: 15.1 G/DL (ref 12–16)
NON-UH HIE INSTR WBC: 5.7
NON-UH HIE K: 4.2 MMOL/L (ref 3.5–5.1)
NON-UH HIE LYMPH %: 32.3 %
NON-UH HIE LYMPH COUNT: 1.84 X1000 (ref 1.2–4.8)
NON-UH HIE MCH: 31.4 PG (ref 27–34)
NON-UH HIE MCHC: 35 G/DL (ref 32–37)
NON-UH HIE MCV: 89.6 FL (ref 80–100)
NON-UH HIE MONO %: 6.1 %
NON-UH HIE MONO COUNT: 0.35 X1000 (ref 0.1–1)
NON-UH HIE MPV: 8.4 FL (ref 7.4–10.4)
NON-UH HIE NA: 142 MMOL/L (ref 135–145)
NON-UH HIE NEUTROPHIL %: 57.8 %
NON-UH HIE NEUTROPHIL COUNT (ANC): 3.3 X1000 (ref 1.4–8.8)
NON-UH HIE NUCLEATED RBC: 0 /100WBC
NON-UH HIE PLATELET: 323 X10 (ref 150–450)
NON-UH HIE RBC: 4.8 X10 (ref 4.2–5.4)
NON-UH HIE RDW: 12.4 % (ref 11.5–14.5)
NON-UH HIE TOTAL PROTEIN: 7.6 G/DL (ref 5.7–8.2)
NON-UH HIE WBC: 5.7 X10 (ref 4.5–11)

## 2024-12-12 LAB
NON-UH HIE HCV QNT BY NAAT (IU/ML): 16 IU/ML
NON-UH HIE HCV QNT BY NAAT (LOG IU/ML): 1.19
NON-UH HIE HCV QNT BY NAAT INTERP: DETECTED

## 2025-01-13 ENCOUNTER — APPOINTMENT (OUTPATIENT)
Dept: GENERAL RADIOLOGY | Age: 40
End: 2025-01-13
Payer: COMMERCIAL

## 2025-01-13 ENCOUNTER — HOSPITAL ENCOUNTER (EMERGENCY)
Age: 40
Discharge: HOME | End: 2025-01-13
Attending: EMERGENCY MEDICINE
Payer: COMMERCIAL

## 2025-01-13 VITALS
HEART RATE: 102 BPM | BODY MASS INDEX: 31.22 KG/M2 | RESPIRATION RATE: 18 BRPM | OXYGEN SATURATION: 99 % | TEMPERATURE: 98.6 F | WEIGHT: 217.6 LBS | SYSTOLIC BLOOD PRESSURE: 126 MMHG | DIASTOLIC BLOOD PRESSURE: 86 MMHG

## 2025-01-13 DIAGNOSIS — J06.9 ACUTE UPPER RESPIRATORY INFECTION: Primary | ICD-10-CM

## 2025-01-13 LAB
FLUAV RNA RESP QL NAA+PROBE: NOT DETECTED
FLUBV RNA RESP QL NAA+PROBE: NOT DETECTED
RSV BY PCR: NOT DETECTED
SARS-COV-2 RNA RESP QL NAA+PROBE: NOT DETECTED
SOURCE: NORMAL
SPECIMEN DESCRIPTION: NORMAL
SPECIMEN SOURCE: NORMAL
SPECIMEN SOURCE: NORMAL
STREP A, MOLECULAR: NEGATIVE

## 2025-01-13 PROCEDURE — 99284 EMERGENCY DEPT VISIT MOD MDM: CPT

## 2025-01-13 PROCEDURE — 6370000000 HC RX 637 (ALT 250 FOR IP): Performed by: EMERGENCY MEDICINE

## 2025-01-13 PROCEDURE — 87651 STREP A DNA AMP PROBE: CPT

## 2025-01-13 PROCEDURE — 71045 X-RAY EXAM CHEST 1 VIEW: CPT

## 2025-01-13 PROCEDURE — 87636 SARSCOV2 & INF A&B AMP PRB: CPT

## 2025-01-13 PROCEDURE — 87634 RSV DNA/RNA AMP PROBE: CPT

## 2025-01-13 RX ORDER — GUAIFENESIN AND DEXTROMETHORPHAN HYDROBROMIDE 1200; 60 MG/1; MG/1
1 TABLET, EXTENDED RELEASE ORAL EVERY 12 HOURS PRN
Qty: 28 TABLET | Refills: 0 | Status: SHIPPED | OUTPATIENT
Start: 2025-01-13

## 2025-01-13 RX ORDER — AZITHROMYCIN 500 MG/1
500 TABLET, FILM COATED ORAL DAILY
Qty: 5 TABLET | Refills: 0 | Status: SHIPPED | OUTPATIENT
Start: 2025-01-13 | End: 2025-01-18

## 2025-01-13 RX ORDER — ALBUTEROL SULFATE 90 UG/1
2 INHALANT RESPIRATORY (INHALATION) 4 TIMES DAILY PRN
Qty: 18 G | Refills: 0 | Status: SHIPPED | OUTPATIENT
Start: 2025-01-13

## 2025-01-13 RX ORDER — PREDNISONE 50 MG/1
50 TABLET ORAL DAILY
Qty: 5 TABLET | Refills: 0 | Status: SHIPPED | OUTPATIENT
Start: 2025-01-13 | End: 2025-01-18

## 2025-01-13 RX ORDER — PREDNISONE 20 MG/1
60 TABLET ORAL ONCE
Status: COMPLETED | OUTPATIENT
Start: 2025-01-13 | End: 2025-01-13

## 2025-01-13 RX ORDER — BENZONATATE 100 MG/1
100 CAPSULE ORAL 3 TIMES DAILY PRN
Qty: 30 CAPSULE | Refills: 0 | Status: SHIPPED | OUTPATIENT
Start: 2025-01-13 | End: 2025-01-23

## 2025-01-13 RX ORDER — AZITHROMYCIN 250 MG/1
500 TABLET, FILM COATED ORAL ONCE
Status: COMPLETED | OUTPATIENT
Start: 2025-01-13 | End: 2025-01-13

## 2025-01-13 RX ADMIN — AZITHROMYCIN DIHYDRATE 500 MG: 250 TABLET ORAL at 17:53

## 2025-01-13 RX ADMIN — PREDNISONE 60 MG: 20 TABLET ORAL at 17:53

## 2025-01-13 ASSESSMENT — PAIN - FUNCTIONAL ASSESSMENT: PAIN_FUNCTIONAL_ASSESSMENT: NONE - DENIES PAIN

## 2025-01-13 ASSESSMENT — LIFESTYLE VARIABLES
HOW OFTEN DO YOU HAVE A DRINK CONTAINING ALCOHOL: NEVER
HOW MANY STANDARD DRINKS CONTAINING ALCOHOL DO YOU HAVE ON A TYPICAL DAY: PATIENT DOES NOT DRINK

## 2025-01-13 ASSESSMENT — PAIN SCALES - GENERAL: PAINLEVEL_OUTOF10: 0

## 2025-01-13 NOTE — DISCHARGE INSTRUCTIONS
Call family doctor tomorrow and schedule a followup appointment to be seen in 2 days    Have your doctor obtain  finalized report of all results    XR CHEST PORTABLE   Final Result   No acute cardiopulmonary disease.           Results for orders placed or performed during the hospital encounter of 01/13/25   COVID-19 & Influenza Combo    Specimen: Nasopharyngeal Swab   Result Value Ref Range    Specimen Description .NASOPHARYNGEAL SWAB     Source .NASOPHARYNGEAL SWAB     SARS-CoV-2 RNA, RT PCR Not Detected Not Detected    Influenza A Not Detected Not Detected    Influenza B Not Detected Not Detected   RSV Detection    Specimen: Nasopharyngeal Swab   Result Value Ref Range    Source .NASOPHARYNGEAL SWAB     RSV by PCR Not Detected Not Detected   Rapid Strep Screen    Specimen: Throat   Result Value Ref Range    Source .THROAT SWAB     Strep A, Molecular NEGATIVE NEGATIVE

## 2025-01-14 ENCOUNTER — APPOINTMENT (OUTPATIENT)
Dept: PRIMARY CARE | Facility: CLINIC | Age: 40
End: 2025-01-14
Payer: COMMERCIAL

## 2025-01-14 ENCOUNTER — LAB (OUTPATIENT)
Dept: LAB | Facility: LAB | Age: 40
End: 2025-01-14
Payer: COMMERCIAL

## 2025-01-14 VITALS
HEART RATE: 121 BPM | OXYGEN SATURATION: 97 % | BODY MASS INDEX: 30.25 KG/M2 | WEIGHT: 210.8 LBS | DIASTOLIC BLOOD PRESSURE: 87 MMHG | SYSTOLIC BLOOD PRESSURE: 136 MMHG

## 2025-01-14 DIAGNOSIS — E66.811 CLASS 1 DRUG-INDUCED OBESITY WITHOUT SERIOUS COMORBIDITY WITH BODY MASS INDEX (BMI) OF 30.0 TO 30.9 IN ADULT: ICD-10-CM

## 2025-01-14 DIAGNOSIS — B18.2: ICD-10-CM

## 2025-01-14 DIAGNOSIS — Z00.00 ROUTINE GENERAL MEDICAL EXAMINATION AT A HEALTH CARE FACILITY: Primary | ICD-10-CM

## 2025-01-14 DIAGNOSIS — E66.1 CLASS 1 DRUG-INDUCED OBESITY WITHOUT SERIOUS COMORBIDITY WITH BODY MASS INDEX (BMI) OF 30.0 TO 30.9 IN ADULT: ICD-10-CM

## 2025-01-14 DIAGNOSIS — Z00.00 ROUTINE GENERAL MEDICAL EXAMINATION AT A HEALTH CARE FACILITY: ICD-10-CM

## 2025-01-14 LAB
NON-UH HIE A/G RATIO: 1.1
NON-UH HIE ALB: 4.4 G/DL (ref 3.4–5)
NON-UH HIE ALK PHOS: 63 UNIT/L (ref 45–117)
NON-UH HIE BASO COUNT: 0.02 X1000 (ref 0–0.2)
NON-UH HIE BASOS %: 0.2 %
NON-UH HIE BILIRUBIN, TOTAL: 0.5 MG/DL (ref 0.3–1.2)
NON-UH HIE BUN/CREAT RATIO: 7.8
NON-UH HIE BUN: 7 MG/DL (ref 9–23)
NON-UH HIE CALCIUM: 10.7 MG/DL (ref 8.7–10.4)
NON-UH HIE CALCULATED OSMOLALITY: 277 MOSM/KG (ref 275–295)
NON-UH HIE CHLORIDE: 104 MMOL/L (ref 98–107)
NON-UH HIE CO2, VENOUS: 28 MMOL/L (ref 20–31)
NON-UH HIE CREATININE: 0.9 MG/DL (ref 0.5–0.8)
NON-UH HIE DIFF?: NO
NON-UH HIE DXH ACTIONS: ABNORMAL
NON-UH HIE EOS COUNT: 0 X1000 (ref 0–0.5)
NON-UH HIE EOSIN %: 0 %
NON-UH HIE GFR AA: >60
NON-UH HIE GLOBULIN: 4 G/DL
NON-UH HIE GLOMERULAR FILTRATION RATE: >60 ML/MIN/1.73M?
NON-UH HIE GLUCOSE: 122 MG/DL (ref 74–106)
NON-UH HIE GOT: 25 UNIT/L (ref 15–37)
NON-UH HIE GPT: 24 UNIT/L (ref 10–49)
NON-UH HIE HCT: 43.6 % (ref 36–46)
NON-UH HIE HGB: 15.2 G/DL (ref 12–16)
NON-UH HIE INSTR WBC: 9.3
NON-UH HIE K: 3.7 MMOL/L (ref 3.5–5.1)
NON-UH HIE LYMPH %: 5.5 %
NON-UH HIE LYMPH COUNT: 0.51 X1000 (ref 1.2–4.8)
NON-UH HIE MCH: 31.3 PG (ref 27–34)
NON-UH HIE MCHC: 34.9 G/DL (ref 32–37)
NON-UH HIE MCV: 89.6 FL (ref 80–100)
NON-UH HIE MONO %: 1.7 %
NON-UH HIE MONO COUNT: 0.16 X1000 (ref 0.1–1)
NON-UH HIE MPV: 8.6 FL (ref 7.4–10.4)
NON-UH HIE NA: 139 MMOL/L (ref 135–145)
NON-UH HIE NEUTROPHIL %: 92.5 %
NON-UH HIE NEUTROPHIL COUNT (ANC): 8.6 X1000 (ref 1.4–8.8)
NON-UH HIE NUCLEATED RBC: 0 /100WBC
NON-UH HIE PLATELET: 324 X10 (ref 150–450)
NON-UH HIE RBC: 4.86 X10 (ref 4.2–5.4)
NON-UH HIE RDW: 12.4 % (ref 11.5–14.5)
NON-UH HIE SCAN DIFFERENTIAL: ABNORMAL
NON-UH HIE TOTAL PROTEIN: 8.4 G/DL (ref 5.7–8.2)
NON-UH HIE WBC: 9.3 X10 (ref 4.5–11)

## 2025-01-14 PROCEDURE — 84481 FREE ASSAY (FT-3): CPT

## 2025-01-14 PROCEDURE — 80061 LIPID PANEL: CPT

## 2025-01-14 PROCEDURE — 84443 ASSAY THYROID STIM HORMONE: CPT

## 2025-01-14 PROCEDURE — 99395 PREV VISIT EST AGE 18-39: CPT | Performed by: FAMILY MEDICINE

## 2025-01-14 PROCEDURE — 99213 OFFICE O/P EST LOW 20 MIN: CPT | Performed by: FAMILY MEDICINE

## 2025-01-14 PROCEDURE — 83036 HEMOGLOBIN GLYCOSYLATED A1C: CPT

## 2025-01-14 PROCEDURE — 1036F TOBACCO NON-USER: CPT | Performed by: FAMILY MEDICINE

## 2025-01-14 PROCEDURE — G0447 BEHAVIOR COUNSEL OBESITY 15M: HCPCS | Performed by: FAMILY MEDICINE

## 2025-01-14 RX ORDER — BENZONATATE 100 MG/1
1 CAPSULE ORAL 3 TIMES DAILY PRN
COMMUNITY
Start: 2025-01-13 | End: 2025-01-23

## 2025-01-14 RX ORDER — BUPROPION HYDROCHLORIDE 150 MG/1
150 TABLET, EXTENDED RELEASE ORAL 2 TIMES DAILY
Qty: 60 TABLET | Refills: 1 | Status: SHIPPED | OUTPATIENT
Start: 2025-01-14 | End: 2025-03-15

## 2025-01-14 RX ORDER — GUAIFENESIN AND DEXTROMETHORPHAN HYDROBROMIDE 1200; 60 MG/1; MG/1
1 TABLET, EXTENDED RELEASE ORAL 2 TIMES DAILY
COMMUNITY
Start: 2025-01-13

## 2025-01-14 RX ORDER — AZITHROMYCIN 500 MG/1
1 TABLET, FILM COATED ORAL DAILY
COMMUNITY
Start: 2025-01-13 | End: 2025-01-18

## 2025-01-14 RX ORDER — ALBUTEROL SULFATE 90 UG/1
2 INHALANT RESPIRATORY (INHALATION) 4 TIMES DAILY PRN
COMMUNITY
Start: 2025-01-13

## 2025-01-14 ASSESSMENT — PATIENT HEALTH QUESTIONNAIRE - PHQ9
SUM OF ALL RESPONSES TO PHQ9 QUESTIONS 1 AND 2: 0
1. LITTLE INTEREST OR PLEASURE IN DOING THINGS: NOT AT ALL
2. FEELING DOWN, DEPRESSED OR HOPELESS: NOT AT ALL

## 2025-01-14 NOTE — PROGRESS NOTES
Patient ID: Lucy Montalvo [id number] is a 39 y.o. female who presents for Follow-up (Follow up to discuss wt).    Pt is here to get complete physical and discuss weight loss.    Patient was a heroin addict sober since 2018.     Pt currently asymptomatic denies nausea vomiting abdominal pain or diarrhea.    Patient completed treatment for  hep C  late 2024, pt following GI Dr. Vollweiler.  Pt is not able to control the portions , no specific craving  Pt is planning to start exercising [place].  Medication - none   Supplements - none  Denies smoking/occasional alcohol,  Immunization   Flu - declined   , TDAp 2019   Pap Smear -2024 was normal     Patient tried Wellbutrin in past with more constipation.  Weight loss options and medication discussed with the patient.    Patient was advised to count calories and start aerobic exercising 30 to 40-minute daily 5 days a week.  Immunization History   Administered Date(s) Administered    Hepatitis B vaccine, adult *Check Product/Dose* 11/05/2018    Pfizer Purple Cap SARS-CoV-2 05/29/2021, 06/19/2021    Tdap vaccine, age 7 year and older (BOOSTRIX, ADACEL) 10/16/2018, 03/25/2019       No Known Allergies    Current Outpatient Medications   Medication Sig Dispense Refill    albuterol 90 mcg/actuation inhaler Inhale 2 puffs 4 times a day as needed.      azithromycin (Zithromax) 500 mg tablet Take 1 tablet (500 mg) by mouth once daily.      benzonatate (Tessalon) 100 mg capsule Take 1 capsule (100 mg) by mouth 3 times a day as needed.      dextromethorphan-guaifenesin (MUCINEX DM) 60-1,200 mg tablet extended release 12 hr Take 1 tablet by mouth 2 times a day.       No current facility-administered medications for this visit.       Past Medical History:   Diagnosis Date    Infectious viral hepatitis 2018     Social History     Tobacco Use    Smoking status: Never    Smokeless tobacco: Never   Vaping Use    Vaping status: Every Day    Substances: Nicotine, Flavoring    Devices:  Disposable   Substance Use Topics    Alcohol use: Yes     Comment: weekly    Drug use: Not Currently     Types: Heroin     Family History   Problem Relation Name Age of Onset    Alzheimer's disease Mother      Alzheimer's disease Maternal Grandmother      Breast cancer Maternal Grandfather      Prostate cancer Maternal Grandfather         /87   Pulse (!) 121   Wt 95.6 kg (210 lb 12.8 oz)   SpO2 97%   BMI 30.25 kg/m²   Review of System  Constitutional:  Negative for appetite change, chills, fatigue, fever and unexpected weight change.   HENT:  Negative for hearing loss and voice change.    Eyes:  Negative for visual disturbance.   Respiratory:  Negative for cough, chest tightness, shortness of breath and wheezing.    Cardiovascular:  Negative for chest pain and leg swelling.   Gastrointestinal:  Negative for abdominal pain, blood in stool, constipation and diarrhea.   Genitourinary:  Negative for difficulty urinating and dysuria.   Musculoskeletal:  Negative for arthralgias and joint swelling.   Skin:  Negative for rash.   Neurological:  Negative for dizziness, weakness, numbness and headaches.   Psychiatric/Behavioral:  Negative for behavioral problems and confusion.    /87   Pulse (!) 121   Wt 95.6 kg (210 lb 12.8 oz)   SpO2 97%   BMI 30.25 kg/m²     General Appearance:    Alert, cooperative, no distress, appears stated age   Head:    Normocephalic, without obvious abnormality, atraumatic   Eyes:    PERRL, conjunctiva/corneas clear, EOM's intact,    Neck:   Supple, symmetrical, No enlargement   Back:     Symmetric, no curvature, ROM normal, no CVA tenderness   Lungs:     Clear to auscultation bilaterally, respirations normal ,no wheezing or ronchi   Chest Wall:    No tenderness or deformity   Abdomen:     Soft, non-tender, bowel sounds active all four quadrants, no masses,   no organomegaly   Extremities:   Extremities normal, atraumatic, no cyanosis or edema   Pulses:   2+ and symmetric all  extremities   Skin:   Skin color, texture, turgor normal, no rashes or lesions   Lymph nodes:   Cervical nodes normal   Neurologic:    normal strength, sensation and reflexes.      Orders Only on 12/12/2024   Component Date Value Ref Range Status    [facility] HCV Qnt by NAAT Interp 12/12/2024 Detected  Not Detected Final    Comment: INTERPRETIVE INFORMATION: HCV by Quantitative NAAT     The quantitative range of this test is ,000,000 IU/mL   (1.18-8.0 log IU/mL).     A result of Not Detected does not rule out the presence   of inhibitors in the patient specimen or hepatitis C virus   RNA concentrations below the level of detection of the   test. Care should be taken when interpreting any single   viral load determination.     This test is intended for use as an aid in the diagnosis of   HCV infection in the following populations: individuals   with antibody evidence of HCV with evidence of liver   disease, individuals suspected to be actively infected with   HCV antibody evidence, and individuals at risk for HCV   infection with antibodies to HCV. Detection of HCV RNA   indicates that the virus is replicating and therefore is   evidence of active infection.      This test is also intended for use as an aid in the   management of patients with an HCV infection undergoing   antiviral therapy. The assay can be                            used to measure HCV RNA   levels at baseline, during treatment, at the end of   treatment, and at the end of follow-up of treatment to   determine sustained or nonsustained viral response. The   results must be interpreted within the context of all   relevant clinical and laboratory findings.      This test should not be used for blood donor screening,   associated reentry protocols, or for screening human cells,   tissues, and cellular tissue-based products (HCT/P).  Performed By: [facility]  [address] [place] [id number]  : Carlos CAVANAUGH  MD Lis, PhD  CLIA Number: [id number]      [facility] HCV Qnt by NAAT (IU/mL) 12/12/2024 16  IU/mL Final    [facility] HCV Qnt by NAAT (log IU* 12/12/2024 1.19   Final   Orders Only on 12/10/2024   Component Date Value Ref Range Status    [facility] MCH 12/10/2024 31.4  27.0 - 34.0 pg Final    [facility] Instr WBC 12/10/2024 5.7   Final    [facility] WBC 12/10/2024 5.7  4.5 - 11.0 x10 Final    [facility] HCT 12/10/2024 43.0  36.0 - 46.0 % Final    [facility] Platelet 12/10/2024 323  150 - 450 x10 Final    [facility] MCV 12/10/2024 89.6  80.0 - 100.0 fL Final    [facility] RDW 12/10/2024 12.4  11.5 - 14.5 % Final    [facility] RBC 12/10/2024 4.80  4.20 - 5.40 x10 Final    Note: RBC morphology is normal unless otherwise stated.  Evaluation performed only if differential is requested.    [facility] Nucleated RBC 12/10/2024 0  /100WBC Final    [facility] MCHC 12/10/2024 35.0  32.0 - 37.0 g/dL Final    [facility] DIFF? 12/10/2024 No   Final    [facility] HGB 12/10/2024 15.1  12.0 - 16.0 g/dL Final    [facility] MPV 12/10/2024 8.4  7.4 - 10.4 fL Final    [facility] Neutrophil Count (ANC) 12/10/2024 3.30  1.40 - 8.80 x1000 Final    [facility] Eos Count 12/10/2024 0.14  0.00 - 0.50 x1000 Final    [facility] Lymph Count 12/10/2024 1.84  1.20 - 4.80 x1000 Final    [facility] Eosin % 12/10/2024 2.4  % Final    [facility] Lymph % 12/10/2024 32.3  % Final    [facility] Baso Count 12/10/2024 0.07  0.00 - 0.20 x1000 Final    [facility] Basos % 12/10/2024 1.3  % Final    [facility] Mono Count 12/10/2024 0.35  0.10 - 1.00 x1000 Final    [facility] Mono % 12/10/2024 6.1  % Final    [facility] Neutrophil % 12/10/2024 57.8  % Final    [facility] Alk Phos 12/10/2024 74  45 - 117 unit/L Final    [facility] Creatinine 12/10/2024 0.8  0.5 - 0.8 mg/dL Final    [facility] GPT 12/10/2024 16  10 - 49 unit/L Final    [facility] CO2, venous 12/10/2024 26.0  20.0 - 31.0 mmol/L Final    [facility] Total Protein 12/10/2024 7.6  5.7 - 8.2 g/dL  Final    Total Protein results may be increased in patients receiving dextran as a blood volume expander    [facility] Glomerular Filtration R* 12/10/2024 >60  mL/min/1.73m? Final    Comment: Non- GFR CalcMedical judgement is necessary to interpret GFR.  The calculated GFR may not accurately reflect renal status in patients >70 years, pregnant women, acutely ill hospitalized patients and patients with acute renal failure or known renal disease.  The MDRD GFR formula is valid only for adults greater than 18 years of age.  Note:  Creatinine clearance (not GFR) should be used for drug dosing.      [facility] Calculated Osmolality 12/10/2024 281  275 - 295 mOsm/kg Final    [facility] K 12/10/2024 4.2  3.5 - 5.1 mmol/L Final    [facility] Globulin 12/10/2024 3.6  g/dL Final    [facility] BUN 12/10/2024 10  9 - 23 mg/dL Final    Comment: -  Venipuncture should occur prior to N-Acetyl Cysteine (NAC) or Metamizole (Sulpyrine) administration due to the potential for falsely depressed results.  -  Blood samples from some patients with monoclonal gammopathies may produce falsely elevated results      [facility] Calcium 12/10/2024 9.6  8.7 - 10.4 mg/dL Final    [facility] ALB 12/10/2024 4.0  3.4 - 5.0 g/dL Final    [facility] Glucose 12/10/2024 84  74 - 106 mg/dL Final    [facility] GOT 12/10/2024 24  15 - 37 unit/L Final    [facility] GFR AA 12/10/2024 >60   Final    Comment:  GFR CalcMedical judgement is necessary to interpret GFR.  The calculated GFR may not accurately reflect renal status in patients >70 years, pregnant women, acutely ill hospitalized patients and patients with acute renal failure or known renal disease.  The MDRD GFR formula is valid only for adults greater than 18 years of age.  Note:  Creatinine clearance (not GFR) should be used for drug dosing.      [facility] Bilirubin, Total 12/10/2024 1.10  0.30 - 1.20 mg/dL Final    Use of this assay is not recommended for patients  undergoing treatment with eltrombopag due to the potential for falsely elevated results.    [facility] Chloride 12/10/2024 108 (H)  98 - 107 mmol/L Final    [facility] A/G Ratio 12/10/2024 1.1   Final    [facility] Na 12/10/2024 142  135 - 145 mmol/L Final    [facility] BUN/Creat Ratio 12/10/2024 12.5   Final       Assessment/Plan   Problem List Items Addressed This Visit          Gastrointestinal and Abdominal    Chronic active hepatitis C (Multi)     S/p Treamnet , following GI - now resolved.            Health Encounters    Routine general medical examination at a health care facility - Primary    Relevant Orders    TSH with reflex to Free T4 if abnormal    Triiodothyronine, Free     Other Visit Diagnoses       Class 1 drug-induced obesity without serious comorbidity with body mass index (BMI) of 30.0 to 30.9 in adult        Relevant Orders    TSH with reflex to Free T4 if abnormal    Hemoglobin A1C    Lipid Panel             Recent CBC CMP reviewed from [facility]  Hep C completely treated and cured.  Patient following GI Dr. Will Weiler  Patient was advised to follow healthy diet and do daily exercise and increase fruits veggies and lean meat and cut down on heavy meat and processed food as much as possible    Will start Wellbutrin twice daily   Patient was advised to get complete blood work    pt was given options for weight loss.  Advised to talk to insurance regarding GLP-1  Will consider Adipex if needed    I spent 15 minutes on obesity councelling. Pt was advised to loose weight. Discussed at length about various ways of loosing weight including healthy Diet, daily Aerobic exercise, calorie counting bariatric surgery, calorie deficit with portion control method, and medications. recommend patient maintain a diet of  1500 calories.

## 2025-01-15 LAB
CHOLEST SERPL-MCNC: 232 MG/DL (ref 0–199)
CHOLESTEROL/HDL RATIO: 3.4
EST. AVERAGE GLUCOSE BLD GHB EST-MCNC: 82 MG/DL
HBA1C MFR BLD: 4.5 %
HDLC SERPL-MCNC: 69 MG/DL
LDLC SERPL CALC-MCNC: 149 MG/DL
NON HDL CHOLESTEROL: 163 MG/DL (ref 0–149)
T3FREE SERPL-MCNC: 2.6 PG/ML (ref 2.3–4.2)
TRIGL SERPL-MCNC: 72 MG/DL (ref 0–149)
TSH SERPL-ACNC: 0.52 MIU/L (ref 0.44–3.98)
VLDL: 14 MG/DL (ref 0–40)

## 2025-01-15 ASSESSMENT — ENCOUNTER SYMPTOMS: COUGH: 1

## 2025-01-15 NOTE — ED PROVIDER NOTES
05:20:50 PM   DISPOSITION CONDITION Stable           PATIENT REFERRED TO:  Tawanna Harvey MD  [address] [id number]  [phone]    Schedule an appointment as soon as possible for a visit in 3 days        DISCHARGE MEDICATIONS:  Discharge Medication List as of 1/13/2025  5:44 PM        START taking these medications    Details   predniSONE (DELTASONE) 50 MG tablet Take 1 tablet by mouth daily for 5 days, Disp-5 tablet, R-0Normal      azithromycin (ZITHROMAX) 500 MG tablet Take 1 tablet by mouth daily for 5 days, Disp-5 tablet, R-0Normal      Dextromethorphan-guaiFENesin (MUCINEX DM MAXIMUM STRENGTH)  MG TB12 Take 1 tablet by mouth every 12 hours as needed (cough and congestion), Disp-28 tablet, R-0Normal      benzonatate (TESSALON) 100 MG capsule Take 1 capsule by mouth 3 times daily as needed for Cough, Disp-30 capsule, R-0Normal      albuterol sulfate HFA (VENTOLIN HFA) 108 (90 Base) MCG/ACT inhaler Inhale 2 puffs into the lungs 4 times daily as needed for Wheezing, Disp-18 g, R-0Normal             DISCONTINUED MEDICATIONS:  Discharge Medication List as of 1/13/2025  5:44 PM        STOP taking these medications       sodium chloride, PF, 0.9 % injection Comments:   Reason for Stopping:         sodium chloride, PF, 0.9 % injection Comments:   Reason for Stopping:         Nutritional Supplements (AFSANEH) PACK Comments:   Reason for Stopping:         polyethylene glycol (MIRALAX) packet Comments:   Reason for Stopping:         senna (SENOKOT) 8.6 MG TABS tablet Comments:   Reason for Stopping:         ondansetron (ZOFRAN) 4 MG/2ML injection Comments:   Reason for Stopping:         oxyCODONE (ROXICODONE) 5 MG immediate release tablet Comments:   Reason for Stopping:         ibuprofen (ADVIL;MOTRIN) 800 MG tablet Comments:   Reason for Stopping:         docusate sodium (COLACE) 100 MG capsule Comments:   Reason for Stopping:                      (Please note that portions of this note were

## 2025-01-16 LAB
NON-UH HIE HCV QNT BY NAAT (IU/ML): NOT DETECTED IU/ML
NON-UH HIE HCV QNT BY NAAT (LOG IU/ML): NOT DETECTED
NON-UH HIE HCV QNT BY NAAT INTERP: NOT DETECTED

## 2025-01-17 ENCOUNTER — TELEPHONE (OUTPATIENT)
Dept: PRIMARY CARE | Facility: CLINIC | Age: 40
End: 2025-01-17
Payer: COMMERCIAL

## 2025-01-17 NOTE — TELEPHONE ENCOUNTER
----- Message from Lashon Odonnell sent at 1/15/2025  3:07 PM EST -----  Labs with elevated cholesterol. Pt should eat green leafy vegetables and fruits and eat lean meat like chicken, Fish and eggs. Cut down in frequency and amount of Pizza, pasta, noodles, ice creams, cookies and sugary drinks like Soda and energy drinks. Advise to walk/ exercise daily for 30 mins 5 days a week.

## 2025-01-28 ENCOUNTER — APPOINTMENT (OUTPATIENT)
Dept: PRIMARY CARE | Facility: CLINIC | Age: 40
End: 2025-01-28
Payer: COMMERCIAL